# Patient Record
Sex: MALE | Race: ASIAN | Employment: FULL TIME | URBAN - METROPOLITAN AREA
[De-identification: names, ages, dates, MRNs, and addresses within clinical notes are randomized per-mention and may not be internally consistent; named-entity substitution may affect disease eponyms.]

---

## 2022-02-01 NOTE — ED INITIAL ASSESSMENT (HPI)
Pt s/p tooth extraction 5 days ago c/o chest tightness when waking up in the morning for the past 2 days.

## 2024-04-01 ENCOUNTER — OFFICE VISIT (OUTPATIENT)
Dept: INTERNAL MEDICINE CLINIC | Facility: CLINIC | Age: 59
End: 2024-04-01
Payer: COMMERCIAL

## 2024-04-01 VITALS
BODY MASS INDEX: 25.07 KG/M2 | WEIGHT: 156 LBS | SYSTOLIC BLOOD PRESSURE: 116 MMHG | DIASTOLIC BLOOD PRESSURE: 72 MMHG | HEART RATE: 67 BPM | HEIGHT: 66 IN

## 2024-04-01 DIAGNOSIS — E55.9 VITAMIN D DEFICIENCY: ICD-10-CM

## 2024-04-01 DIAGNOSIS — Z12.11 ENCOUNTER FOR SCREENING COLONOSCOPY: ICD-10-CM

## 2024-04-01 DIAGNOSIS — Z76.89 ENCOUNTER TO ESTABLISH CARE WITH NEW DOCTOR: ICD-10-CM

## 2024-04-01 DIAGNOSIS — Z00.00 ANNUAL PHYSICAL EXAM: Primary | ICD-10-CM

## 2024-04-01 NOTE — PROGRESS NOTES
History of Present Illness   Patient ID: Luca Menendez is a 58 year old male.  Chief Complaint: Physical      Luca Menendez is a pleasant 58 year old male with no significant PMHx who presents for annual physical exam. Luca Menendez is doing well today.    Pt is employed as , has 2 sons (one of them works in [employer])    Health Maintenance  - All care gaps addressed with patient.   Health Maintenance Due   Topic Date Due    Annual Physical  Never done    Colorectal Cancer Screening  Never done    PSA  Never done    COVID-19 Vaccine (3 - 2023-24 season) 09/01/2023       Colonoscopy (45+): never done  Heart Scan/Calcium CT: never done      Review of Systems  Review of Systems   Constitutional: Negative.    Respiratory: Negative.     Cardiovascular: Negative.    Gastrointestinal: Negative.    Skin: Negative.    Neurological: Negative.        Physical Exam  Vitals:    04/01/24 1645   BP: 116/72   Pulse: 67   Weight: 156 lb (70.8 kg)   Height: 5' 6\" (1.676 m)     Body mass index is 25.18 kg/m².  BP Readings from Last 3 Encounters:   04/01/24 116/72   02/01/22 142/86     Physical Exam  Constitutional:       Appearance: He is well-developed.   Cardiovascular:      Rate and Rhythm: Normal rate and regular rhythm.      Heart sounds: Normal heart sounds.   Pulmonary:      Effort: Pulmonary effort is normal.      Breath sounds: Normal breath sounds.   Abdominal:      General: Bowel sounds are normal.      Palpations: Abdomen is soft.   Skin:     General: Skin is warm and dry.   Neurological:      Mental Status: He is alert and oriented to person, place, and time.      Deep Tendon Reflexes: Reflexes are normal and symmetric.           Labs & Imaging  Pertinent labs and imaging reviewed.   Lab Results   Component Value Date     (H) 02/01/2022    BUN 20 (H) 02/01/2022    BUNCREA 19.6 02/01/2022    CREATSERUM 1.02 02/01/2022    ANIONGAP 6 02/01/2022    GFRNAA 81 02/01/2022    GFRAA 93  02/01/2022    CA 8.6 02/01/2022    OSMOCALC 295 02/01/2022     02/01/2022    K 4.0 02/01/2022     02/01/2022    CO2 26.0 02/01/2022     No results found for: \"EAG\", \"A1C\"  Lab Results   Component Value Date    WBC 6.5 02/01/2022    RBC 4.63 02/01/2022    HGB 13.5 02/01/2022    HCT 41.4 02/01/2022    MCV 89.4 02/01/2022    MCH 29.2 02/01/2022    MCHC 32.6 02/01/2022    RDW 12.0 02/01/2022    .0 02/01/2022     No results found for: \"CHOLEST\", \"TRIG\", \"HDL\", \"LDL\", \"VLDL\", \"TCHDLRATIO\", \"NONHDLC\", \"CHOLHDLRATIO\", \"CALCNONHDL\"  The ASCVD Risk score (Gume WOOTEN, et al., 2019) failed to calculate for the following reasons:    Cannot find a previous HDL lab    Cannot find a previous total cholesterol lab    Medical History    Reviewed allergies:  No Known Allergies     Reviewed:  There are no problems to display for this patient.     Reviewed:  History reviewed. No pertinent past medical history.   Reviewed:  Family History   Problem Relation Age of Onset    Diabetes Mother     Diabetes Father     No Known Problems Sister     No Known Problems Brother     No Known Problems Brother        Reviewed:  Past Surgical History:   Procedure Laterality Date    EACH ADD TOOTH EXTRACTION        Reviewed:  Social History     Socioeconomic History    Marital status:    Tobacco Use    Smoking status: Never    Smokeless tobacco: Never   Vaping Use    Vaping Use: Never used   Substance and Sexual Activity    Alcohol use: Never    Drug use: Never    Sexual activity: Yes     Partners: Female      Reviewed:  No current outpatient medications on file.          Assessment & Plan    1. Annual physical exam  - GASTRO - INTERNAL  - PSA, Total W Reflex To Free  - Vitamin D; Future  - TSH W Reflex To Free T4  - Lipid Panel  - CBC With Differential With Platelet  - Hemoglobin A1C  - Comp Metabolic Panel (14)  - CT CALCIUM SCORING; Future  Patient here for physical exam and due for following.  Plan:  -order the following  Labs: CBC, CMP, Lipid Panel, A1C, TSH, Vitamin D, PSA  -Referral to GI for screening colonoscopy       2. Encounter to establish care with new doctor  - GASTRO - INTERNAL  - PSA, Total W Reflex To Free  - Vitamin D; Future  - TSH W Reflex To Free T4  - Lipid Panel  - CBC With Differential With Platelet  - Hemoglobin A1C  - Comp Metabolic Panel (14)  - CT CALCIUM SCORING; Future    Patient here for physical exam and due for following.  Plan:  -order the following Labs: CBC, CMP, Lipid Panel, A1C, TSH, Vitamin D, PSA  -Referral to GI for screening colonoscopy  3. Encounter for screening colonoscopy  - GASTRO - INTERNAL    Patient here for physical exam and due for following.  Plan:  -Referral to GI for screening colonoscopy  4. Vitamin D deficiency  - Vitamin D; Future  Check Vitamin D level.         Follow Up:   Return in about 1 year (around 4/1/2025) for Annual Physical .      Rajinder Silva MD  Internal Medicine      Patient asked to sign release of information for outside records if not already requested, make future office/imaging appointments at the  prior to leaving, and to sign up for [employer] if not already active.  Preventive measures and further education discussed with patient as per after visit summary. Potential medication side effects discussed. All questions answered to best of ability.   Call office with any questions. Seek emergency care if necessary.   Patient understands and agrees to follow directions and advice.      ----------------------------------------- PATIENT INSTRUCTIONS-----------------------------------------     There are no Patient Instructions on file for this visit.

## 2024-04-16 LAB
% FREE PSA: 25 % (CALC)
ABSOLUTE BASOPHILS: 39 CELLS/UL (ref 0–200)
ABSOLUTE EOSINOPHILS: 142 CELLS/UL (ref 15–500)
ABSOLUTE LYMPHOCYTES: 1896 CELLS/UL (ref 850–3900)
ABSOLUTE MONOCYTES: 353 CELLS/UL (ref 200–950)
ABSOLUTE NEUTROPHILS: 2470 CELLS/UL (ref 1500–7800)
ALBUMIN/GLOBULIN RATIO: 1.3 (CALC) (ref 1–2.5)
ALBUMIN: 4.2 G/DL (ref 3.6–5.1)
ALKALINE PHOSPHATASE: 60 U/L (ref 35–144)
ALT: 13 U/L (ref 9–46)
AST: 16 U/L (ref 10–35)
BASOPHILS: 0.8 %
BILIRUBIN, TOTAL: 0.7 MG/DL (ref 0.2–1.2)
BUN: 15 MG/DL (ref 7–25)
CALCIUM: 9.4 MG/DL (ref 8.6–10.3)
CARBON DIOXIDE: 29 MMOL/L (ref 20–32)
CHLORIDE: 104 MMOL/L (ref 98–110)
CHOL/HDLC RATIO: 5.2 (CALC)
CHOLESTEROL, TOTAL: 223 MG/DL
CREATININE: 0.94 MG/DL (ref 0.7–1.35)
EGFR: 93 ML/MIN/1.73M2
EOSINOPHILS: 2.9 %
FREE PSA: 0.1 NG/ML
GLOBULIN: 3.2 G/DL (CALC) (ref 1.9–3.7)
GLUCOSE: 98 MG/DL (ref 65–99)
HDL CHOLESTEROL: 43 MG/DL
HEMATOCRIT: 45.8 % (ref 38.5–50)
HEMOGLOBIN A1C: 5.8 % OF TOTAL HGB
HEMOGLOBIN: 15.1 G/DL (ref 13.2–17.1)
LDL-CHOLESTEROL: 154 MG/DL (CALC)
LYMPHOCYTES: 38.7 %
MCH: 29.5 PG (ref 27–33)
MCHC: 33 G/DL (ref 32–36)
MCV: 89.6 FL (ref 80–100)
MONOCYTES: 7.2 %
MPV: 9.3 FL (ref 7.5–12.5)
NEUTROPHILS: 50.4 %
NON-HDL CHOLESTEROL: 180 MG/DL (CALC)
PLATELET COUNT: 252 THOUSAND/UL (ref 140–400)
POTASSIUM: 4.1 MMOL/L (ref 3.5–5.3)
PROTEIN, TOTAL: 7.4 G/DL (ref 6.1–8.1)
RDW: 12.1 % (ref 11–15)
RED BLOOD CELL COUNT: 5.11 MILLION/UL (ref 4.2–5.8)
SODIUM: 142 MMOL/L (ref 135–146)
TOTAL PSA: 0.4 NG/ML
TRIGLYCERIDES: 140 MG/DL
TSH W/REFLEX TO FT4: 0.76 MIU/L (ref 0.4–4.5)
WHITE BLOOD CELL COUNT: 4.9 THOUSAND/UL (ref 3.8–10.8)

## 2024-04-17 DIAGNOSIS — E78.5 DYSLIPIDEMIA: Primary | ICD-10-CM

## 2024-04-17 RX ORDER — ROSUVASTATIN CALCIUM 10 MG/1
10 TABLET, COATED ORAL NIGHTLY
Qty: 90 TABLET | Refills: 3 | Status: SHIPPED | OUTPATIENT
Start: 2024-04-17